# Patient Record
Sex: MALE | URBAN - METROPOLITAN AREA
[De-identification: names, ages, dates, MRNs, and addresses within clinical notes are randomized per-mention and may not be internally consistent; named-entity substitution may affect disease eponyms.]

---

## 2024-04-09 ENCOUNTER — APPOINTMENT (RX ONLY)
Dept: URBAN - METROPOLITAN AREA CLINIC 183 | Facility: CLINIC | Age: 84
Setting detail: DERMATOLOGY
End: 2024-04-09

## 2024-04-09 VITALS — WEIGHT: 177 LBS | HEIGHT: 67 IN

## 2024-04-09 DIAGNOSIS — L82.1 OTHER SEBORRHEIC KERATOSIS: ICD-10-CM

## 2024-04-09 DIAGNOSIS — Z71.89 OTHER SPECIFIED COUNSELING: ICD-10-CM

## 2024-04-09 PROCEDURE — ? COUNSELING

## 2024-04-09 PROCEDURE — 99202 OFFICE O/P NEW SF 15 MIN: CPT

## 2024-04-09 PROCEDURE — ? SUNSCREEN RECOMMENDATIONS

## 2024-04-09 ASSESSMENT — LOCATION DETAILED DESCRIPTION DERM
LOCATION DETAILED: EPIGASTRIC SKIN
LOCATION DETAILED: RIGHT CLAVICULAR SKIN
LOCATION DETAILED: LEFT CLAVICULAR NECK
LOCATION DETAILED: SUPERIOR THORACIC SPINE
LOCATION DETAILED: RIGHT LATERAL ABDOMEN

## 2024-04-09 ASSESSMENT — LOCATION SIMPLE DESCRIPTION DERM
LOCATION SIMPLE: RIGHT CLAVICULAR SKIN
LOCATION SIMPLE: ABDOMEN
LOCATION SIMPLE: LEFT ANTERIOR NECK
LOCATION SIMPLE: UPPER BACK

## 2024-04-09 ASSESSMENT — LOCATION ZONE DERM
LOCATION ZONE: TRUNK
LOCATION ZONE: NECK

## 2024-04-09 NOTE — PROCEDURE: COUNSELING
Detail Level: Zone
Sunscreen Recommendations: Pt is a tanning bed user. Discussed risks of tanning beds. Pt understands risks.
Detail Level: Detailed